# Patient Record
Sex: FEMALE
[De-identification: names, ages, dates, MRNs, and addresses within clinical notes are randomized per-mention and may not be internally consistent; named-entity substitution may affect disease eponyms.]

---

## 2022-11-20 ENCOUNTER — P2P PATIENT RECORD (OUTPATIENT)
Age: 62
End: 2022-11-20

## 2025-01-23 ENCOUNTER — OFFICE VISIT (OUTPATIENT)
Dept: URBAN - METROPOLITAN AREA CLINIC 96 | Facility: CLINIC | Age: 65
End: 2025-01-23

## 2025-01-27 ENCOUNTER — OFFICE VISIT (OUTPATIENT)
Dept: URBAN - METROPOLITAN AREA CLINIC 96 | Facility: CLINIC | Age: 65
End: 2025-01-27

## 2025-03-11 ENCOUNTER — DASHBOARD ENCOUNTERS (OUTPATIENT)
Age: 65
End: 2025-03-11

## 2025-03-11 ENCOUNTER — OFFICE VISIT (OUTPATIENT)
Dept: URBAN - METROPOLITAN AREA CLINIC 98 | Facility: CLINIC | Age: 65
End: 2025-03-11
Payer: COMMERCIAL

## 2025-03-11 ENCOUNTER — LAB OUTSIDE AN ENCOUNTER (OUTPATIENT)
Dept: URBAN - METROPOLITAN AREA CLINIC 98 | Facility: CLINIC | Age: 65
End: 2025-03-11

## 2025-03-11 VITALS
HEART RATE: 73 BPM | TEMPERATURE: 97.2 F | BODY MASS INDEX: 38.29 KG/M2 | SYSTOLIC BLOOD PRESSURE: 124 MMHG | HEIGHT: 65 IN | DIASTOLIC BLOOD PRESSURE: 72 MMHG | WEIGHT: 229.8 LBS

## 2025-03-11 DIAGNOSIS — Z12.11 COLON CANCER SCREENING: ICD-10-CM

## 2025-03-11 DIAGNOSIS — Z95.5 HISTORY OF HEART ARTERY STENT: ICD-10-CM

## 2025-03-11 DIAGNOSIS — Z86.0101 PERSONAL HISTORY OF ADENOMATOUS AND SERRATED COLON POLYPS: ICD-10-CM

## 2025-03-11 DIAGNOSIS — D50.0 BLOOD LOSS ANEMIA: ICD-10-CM

## 2025-03-11 DIAGNOSIS — R93.2 ABNORMAL CT OF LIVER: ICD-10-CM

## 2025-03-11 DIAGNOSIS — Z79.02 ANTIPLATELET OR ANTITHROMBOTIC LONG-TERM USE: ICD-10-CM

## 2025-03-11 DIAGNOSIS — K57.31 DIVERTICULAR HEMORRHAGE: ICD-10-CM

## 2025-03-11 PROBLEM — 413532003: Status: ACTIVE | Noted: 2025-03-11

## 2025-03-11 PROBLEM — 15634181000119107: Status: ACTIVE | Noted: 2025-03-11

## 2025-03-11 PROBLEM — 197092000: Status: ACTIVE | Noted: 2025-03-11

## 2025-03-11 PROBLEM — 428375006: Status: ACTIVE | Noted: 2025-03-11

## 2025-03-11 PROCEDURE — 99204 OFFICE O/P NEW MOD 45 MIN: CPT | Performed by: INTERNAL MEDICINE

## 2025-03-11 NOTE — HPI-TODAY'S VISIT:
PMH of CAD s/p stent May 2024, RLE edema after ankle injury, and AC use Went to [place] 12/25/24 with GI bleeding CTA with bleed at hepatic flexure Colonoscopy without overt bleeding Discharged with hgb 8.1 No bleeding since discharge Labs with PCP showed hgb after discharge Incidental no

## 2025-04-08 ENCOUNTER — LAB OUTSIDE AN ENCOUNTER (OUTPATIENT)
Dept: URBAN - METROPOLITAN AREA TELEHEALTH 2 | Facility: TELEHEALTH | Age: 65
End: 2025-04-08

## 2025-04-08 PROBLEM — 93871000119101: Status: ACTIVE | Noted: 2025-04-08

## 2025-04-09 ENCOUNTER — OFFICE VISIT (OUTPATIENT)
Dept: URBAN - METROPOLITAN AREA TELEHEALTH 2 | Facility: TELEHEALTH | Age: 65
End: 2025-04-09
Payer: COMMERCIAL

## 2025-04-09 DIAGNOSIS — Z86.0101 PERSONAL HISTORY OF ADENOMATOUS AND SERRATED COLON POLYPS: ICD-10-CM

## 2025-04-09 DIAGNOSIS — Z12.11 COLON CANCER SCREENING: ICD-10-CM

## 2025-04-09 DIAGNOSIS — R93.2 ABNORMAL CT OF LIVER: ICD-10-CM

## 2025-04-09 DIAGNOSIS — D50.0 BLOOD LOSS ANEMIA: ICD-10-CM

## 2025-04-09 DIAGNOSIS — K57.31 DIVERTICULAR HEMORRHAGE: ICD-10-CM

## 2025-04-09 DIAGNOSIS — Z95.5 HISTORY OF HEART ARTERY STENT: ICD-10-CM

## 2025-04-09 DIAGNOSIS — Z79.02 ANTIPLATELET OR ANTITHROMBOTIC LONG-TERM USE: ICD-10-CM

## 2025-04-09 DIAGNOSIS — Z86.19 HISTORY OF HEPATITIS C: ICD-10-CM

## 2025-04-09 PROCEDURE — G8482 FLU IMMUNIZE ORDER/ADMIN: HCPCS | Performed by: INTERNAL MEDICINE

## 2025-04-09 PROCEDURE — 3017F COLORECTAL CA SCREEN DOC REV: CPT | Performed by: INTERNAL MEDICINE

## 2025-04-09 PROCEDURE — 99214 OFFICE O/P EST MOD 30 MIN: CPT | Performed by: INTERNAL MEDICINE

## 2025-04-09 PROCEDURE — G8427 DOCREV CUR MEDS BY ELIG CLIN: HCPCS | Performed by: INTERNAL MEDICINE

## 2025-04-09 PROCEDURE — G8417 CALC BMI ABV UP PARAM F/U: HCPCS | Performed by: INTERNAL MEDICINE

## 2025-04-09 PROCEDURE — G9903 PT SCRN TBCO ID AS NON USER: HCPCS | Performed by: INTERNAL MEDICINE

## 2025-04-09 NOTE — HPI-TODAY'S VISIT:
f/u visit Recent labs with PCP show Hgb up to 10.8 No bleeding Awaiting US to follow up CT with hepatic fibrosis   . PRIOR VISIT: [facility] of CAD s/p stent May 2024, RLE edema after ankle injury, and AC use Went to [place] 12/25/24 with GI bleeding CTA with bleed at hepatic flexure Colonoscopy without overt bleeding Discharged with hgb 8.1 No bleeding since discharge Labs with PCP showed hgb after discharge Incidental no

## 2025-04-10 ENCOUNTER — TELEPHONE ENCOUNTER (OUTPATIENT)
Dept: URBAN - METROPOLITAN AREA CLINIC 95 | Facility: CLINIC | Age: 65
End: 2025-04-10

## 2025-04-10 ENCOUNTER — TELEPHONE ENCOUNTER (OUTPATIENT)
Dept: URBAN - METROPOLITAN AREA CLINIC 98 | Facility: CLINIC | Age: 65
End: 2025-04-10

## 2025-04-14 ENCOUNTER — LAB OUTSIDE AN ENCOUNTER (OUTPATIENT)
Dept: URBAN - METROPOLITAN AREA CLINIC 98 | Facility: CLINIC | Age: 65
End: 2025-04-14

## 2025-04-14 LAB
ABSOLUTE BASOPHIL COUNT: 0.03
ABSOLUTE EOSINOPHIL COUNT: 0.11
ABSOLUTE IMMATURE GRANULOCYTE  COUNT: 0.01
ABSOLUTE LYMPHOCYTE COUNT: 1.69
ABSOLUTE MONOCYTE COUNT: 0.3
ABSOLUTE NEUTROPHIL COUNT (ANC): 1.88
ABSOLUTE NRBC  COUNT: 0
BASOPHIL AUTO: 1
EOS AUTO: 3
FERRITIN LEVEL: 7
HCT: 34.3
HEP C AB: REACTIVE
HGB: 10.7
IMMATURE GRANULOCYTES AUTO: 0.2
LYMPH AUTO: 42
MCH: 26.9
MCHC: 31.2
MCV: 86.2
MONO AUTO: 8
MPV: 10.5
NEUTRO AUTO: 47
NRBC AUTO: 0
PERFORMING LAB: (no result)
PLATELETS: 181
RBC: 3.98
RDW: 17.2
WBC: 4

## 2025-04-15 LAB
HCV RNA RESULT: (no result)
HCV RNA SOURCE.: (no result)
PERFORMING LAB: (no result)

## 2025-04-17 ENCOUNTER — OFFICE VISIT (OUTPATIENT)
Dept: URBAN - METROPOLITAN AREA CLINIC 91 | Facility: CLINIC | Age: 65
End: 2025-04-17
Payer: COMMERCIAL

## 2025-04-17 DIAGNOSIS — K76.0 FATTY (CHANGE OF) LIVER: ICD-10-CM

## 2025-04-17 PROCEDURE — 76705 ECHO EXAM OF ABDOMEN: CPT | Performed by: INTERNAL MEDICINE

## 2025-05-14 ENCOUNTER — LAB OUTSIDE AN ENCOUNTER (OUTPATIENT)
Dept: URBAN - METROPOLITAN AREA TELEHEALTH 2 | Facility: TELEHEALTH | Age: 65
End: 2025-05-14

## 2025-05-14 ENCOUNTER — OFFICE VISIT (OUTPATIENT)
Dept: URBAN - METROPOLITAN AREA TELEHEALTH 2 | Facility: TELEHEALTH | Age: 65
End: 2025-05-14
Payer: COMMERCIAL

## 2025-05-14 DIAGNOSIS — K57.31 DIVERTICULAR HEMORRHAGE: ICD-10-CM

## 2025-05-14 DIAGNOSIS — D50.0 BLOOD LOSS ANEMIA: ICD-10-CM

## 2025-05-14 DIAGNOSIS — R93.2 ABNORMAL CT OF LIVER: ICD-10-CM

## 2025-05-14 DIAGNOSIS — Z12.11 COLON CANCER SCREENING: ICD-10-CM

## 2025-05-14 DIAGNOSIS — Z86.19 HISTORY OF HEPATITIS C: ICD-10-CM

## 2025-05-14 DIAGNOSIS — K74.00 LIVER FIBROSIS: ICD-10-CM

## 2025-05-14 DIAGNOSIS — Z79.02 ANTIPLATELET OR ANTITHROMBOTIC LONG-TERM USE: ICD-10-CM

## 2025-05-14 DIAGNOSIS — Z95.5 HISTORY OF HEART ARTERY STENT: ICD-10-CM

## 2025-05-14 DIAGNOSIS — Z86.0101 PERSONAL HISTORY OF ADENOMATOUS AND SERRATED COLON POLYPS: ICD-10-CM

## 2025-05-14 DIAGNOSIS — K21.9 GASTROESOPHAGEAL REFLUX DISEASE, UNSPECIFIED WHETHER ESOPHAGITIS PRESENT: ICD-10-CM

## 2025-05-14 PROBLEM — 62484002: Status: ACTIVE | Noted: 2025-05-14

## 2025-05-14 PROCEDURE — 99214 OFFICE O/P EST MOD 30 MIN: CPT | Performed by: INTERNAL MEDICINE

## 2025-05-14 RX ORDER — FAMOTIDINE 40 MG/1
1 TABLET AT BEDTIME TABLET, FILM COATED ORAL ONCE A DAY
Qty: 90 | Refills: 3 | OUTPATIENT
Start: 2025-05-14

## 2025-05-14 NOTE — HPI-TODAY'S VISIT:
f/u visit Last seen 4/2025 [facility] with suggestion of steatosis and possible fibrosis She is exercising more with cardiac rehab . Now reports some fecal urgency and leakage with exercise   . PRIOR VISIT: Recent labs with PCP show Hgb up to 10.8 No bleeding Awaiting US to follow up CT with hepatic fibrosis   . PRIOR VISIT: PMH of CAD s/p stent May 2024, RLE edema after ankle injury, and AC use Went to [place] 12/25/24 with GI bleeding CTA with bleed at hepatic flexure Colonoscopy without overt bleeding Discharged with hgb 8.1 No bleeding since discharge Labs with PCP showed hgb after discharge Incidental no

## 2025-06-18 ENCOUNTER — OFFICE VISIT (OUTPATIENT)
Dept: URBAN - METROPOLITAN AREA CLINIC 97 | Facility: CLINIC | Age: 65
End: 2025-06-18

## 2025-07-23 ENCOUNTER — OFFICE VISIT (OUTPATIENT)
Dept: URBAN - METROPOLITAN AREA CLINIC 97 | Facility: CLINIC | Age: 65
End: 2025-07-23

## 2025-08-12 ENCOUNTER — OFFICE VISIT (OUTPATIENT)
Dept: URBAN - METROPOLITAN AREA CLINIC 98 | Facility: CLINIC | Age: 65
End: 2025-08-12